# Patient Record
Sex: FEMALE | Race: BLACK OR AFRICAN AMERICAN
[De-identification: names, ages, dates, MRNs, and addresses within clinical notes are randomized per-mention and may not be internally consistent; named-entity substitution may affect disease eponyms.]

---

## 2017-02-14 ENCOUNTER — HOSPITAL ENCOUNTER (EMERGENCY)
Dept: HOSPITAL 74 - JER | Age: 27
Discharge: HOME | End: 2017-02-14
Payer: COMMERCIAL

## 2017-02-14 VITALS — BODY MASS INDEX: 31.3 KG/M2

## 2017-02-14 VITALS — SYSTOLIC BLOOD PRESSURE: 132 MMHG | TEMPERATURE: 98.2 F | DIASTOLIC BLOOD PRESSURE: 78 MMHG | HEART RATE: 72 BPM
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
## 2017-02-14 DIAGNOSIS — H11.32: ICD-10-CM

## 2017-02-14 DIAGNOSIS — S05.12XA: ICD-10-CM

## 2017-02-14 DIAGNOSIS — S09.8XXA: Primary | ICD-10-CM

## 2017-02-14 LAB
HIV 1 & 2 AB: NEGATIVE
HIV 1 AGP24: NEGATIVE
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
## 2017-08-01 ENCOUNTER — HOSPITAL ENCOUNTER (INPATIENT)
Dept: HOSPITAL 74 - JER | Age: 27
LOS: 4 days | Discharge: HOME | DRG: 544 | End: 2017-08-05
Attending: OBSTETRICS & GYNECOLOGY | Admitting: OBSTETRICS & GYNECOLOGY
Payer: SELF-PAY

## 2017-08-01 VITALS — BODY MASS INDEX: 38.4 KG/M2

## 2017-08-01 DIAGNOSIS — D69.6: ICD-10-CM

## 2017-08-01 DIAGNOSIS — A41.89: ICD-10-CM

## 2017-08-01 DIAGNOSIS — D72.828: ICD-10-CM

## 2017-08-01 DIAGNOSIS — F17.210: ICD-10-CM

## 2017-08-01 DIAGNOSIS — O03.87: Primary | ICD-10-CM

## 2017-08-01 DIAGNOSIS — F41.8: ICD-10-CM

## 2017-08-01 LAB
ALBUMIN SERPL-MCNC: 3 G/DL (ref 3.4–5)
ALP SERPL-CCNC: 73 U/L (ref 45–117)
ALT SERPL-CCNC: 41 U/L (ref 12–78)
ANION GAP SERPL CALC-SCNC: 9 MMOL/L (ref 8–16)
AST SERPL-CCNC: 35 U/L (ref 15–37)
BILIRUB SERPL-MCNC: 0.3 MG/DL (ref 0.2–1)
CALCIUM SERPL-MCNC: 8.2 MG/DL (ref 8.5–10.1)
CO2 SERPL-SCNC: 23 MMOL/L (ref 21–32)
CREAT SERPL-MCNC: 0.7 MG/DL (ref 0.55–1.02)
DEPRECATED RDW RBC AUTO: 20.2 % (ref 11.6–15.6)
GLUCOSE SERPL-MCNC: 107 MG/DL (ref 74–106)
MCH RBC QN AUTO: 26.4 PG (ref 25.7–33.7)
MCHC RBC AUTO-ENTMCNC: 31.6 G/DL (ref 32–36)
MCV RBC: 83.5 FL (ref 80–96)
NEUTROPHILS # BLD: 79 % (ref 42.8–82.8)
PLATELET # BLD AUTO: 179 K/MM3 (ref 134–434)
PLATELET # BLD EST: ADEQUATE 10*3/UL
PMV BLD: 8.5 FL (ref 7.5–11.1)
PROT SERPL-MCNC: 6.5 G/DL (ref 6.4–8.2)
WBC # BLD AUTO: 20.5 K/MM3 (ref 4–10)

## 2017-08-01 PROCEDURE — P9058 RBC, L/R, CMV-NEG, IRRAD: HCPCS

## 2017-08-01 PROCEDURE — P9038 RBC IRRADIATED: HCPCS

## 2017-08-01 PROCEDURE — 10D17ZZ EXTRACTION OF PRODUCTS OF CONCEPTION, RETAINED, VIA NATURAL OR ARTIFICIAL OPENING: ICD-10-PCS | Performed by: OBSTETRICS & GYNECOLOGY

## 2017-08-01 PROCEDURE — 30233N1 TRANSFUSION OF NONAUTOLOGOUS RED BLOOD CELLS INTO PERIPHERAL VEIN, PERCUTANEOUS APPROACH: ICD-10-PCS

## 2017-08-01 RX ADMIN — Medication SCH MLS/HR: at 14:14

## 2017-08-01 RX ADMIN — CLINDAMYCIN IN 5 PERCENT DEXTROSE SCH MLS/HR: 18 INJECTION, SOLUTION INTRAVENOUS at 21:23

## 2017-08-01 NOTE — PDOC
History of Present Illness





- General


History Source: Patient


Exam Limitations: No Limitations





- History of Present Illness


Initial Comments: 


CHIEF COMPLAINT:  28 y/o , unknown if currently pregnant BIB EMS c/o 

vaginal bleeding and strong abdominal cramping since this morning. 





HISTORY OF PRESENT ILLNESS:  The patient admits she has very irregular periods, 

and states her period hasn't been "normal" for at least 1 year.  She states 

that she did not have a menstrual cycle in  but did have 3 days of spotting 

in July.  She took a pregnancy test in late  which came back negative.  She 

states she has never carried a pregnancy to term with her last 2 being 

delivered at 6 months gestation.  She states this morning she woke up with some 

abdominal pain that feels like contractions.  At some point she stood up and 

felt a "gush" of liquid discharge.  She states she has passed multiple clots 

and is having pain about every 5 minutes.  She denies f/c, n/v/d, CP, SOB, 

hematuria, dysuria.  





Vital signs on arrival are within normal limits. 





REVIEW OF SYSTEMS:


GENERAL/CONSTITUTIONAL: No fever/chills. No weakness. No weight change.


HEAD, EYES, EARS, NOSE AND THROAT: No change in vision. No ear pain or 

discharge. No sore throat.


CARDIOVASCULAR: No chest pain or shortness of breath.


RESPIRATORY: No cough, wheezing, or hemoptysis.


GASTROINTESTINAL: +abdominal pain.  No nausea, vomiting, diarrhea.  +vaginal 

discharge. 


GENITOURINARY: No dysuria, frequency, or change in urination.


MUSCULOSKELETAL: No joint or muscle swelling or pain. No neck or back pain.


SKIN: No rash or easy bruising.


NEUROLOGIC: No headache, vertigo, loss of consciousness, or loss of sensation.





PHYSICAL EXAM:


GENERAL: The patient is awake, alert, and fully oriented, in intermittent pain. 


HEAD: Normal with no signs of trauma.


ENT: Pupils equal, round and reactive to light, extraocular movements intact, 

sclera anicteric, conjunctiva clear. Neck supple.


LUNGS: Clear to auscultation bilaterally. Normal excursion. No respiratory 

distress or use of accessory muscles.


CV: RRR, S1/S2, no MRG. Cap refill < 2 sec.


ABDOMEN: Soft, obese, TTP of lower abdominal and pelvic region.  No rebound, 

guarding or rigidity.


VAGINAL:  Copious blood in the vaginal vault with active passage of 3 very 

large clots.  Os cannot be visualized secondary to blood.  There is clear 

liquid discharge on the floor and around the patient.  


EXTREMITIES: Normal range of motion, no edema.


NEUROLOGICAL: Normal speech, normal gait. CN II-XII grossly intact.


PSYCH: Normal mood, normal affect.


SKIN: Warm, dry, normal turgor, no rashes or lesions noted.




















<Radha Main - Last Filed: 17 17:54>





<Reena Escobar - Last Filed: 17 11:41>





- General


Chief Complaint: Pain


Stated Complaint: PAIN


Time Seen by Provider: 17 10:34





Past History





- Past Medical History


Anemia: Yes


Asthma: No


Cancer: No


Cardiac Disorders: No


Diabetes: No


HTN: No


Suicide Attempt (Hx): No


Seizures: No


Thyroid Disease: No





- Reproductive History


 (#): 4


Para: 4


Therapeutic (s) & number: No


Spontaneous : 0





- Immunization History


Immunization Up to Date: Yes





- Psycho/Social/Smoking Cessation Hx


Anxiety: No


Suicidal Ideation: No


Smoking History: Current some day smoker


Have you smoked in the past 12 months: Yes


Number of Cigarettes Smoked Daily: 5


Hx Alcohol Use: No


Drug/Substance Use Hx: No


Substance Use Type: None


Hx Substance Use Treatment: No





<Radha Main - Last Filed: 17 17:54>





<Reena Escobar - Last Filed: 17 11:41>





- Past Medical History


Allergies/Adverse Reactions: 


 Allergies











Allergy/AdvReac Type Severity Reaction Status Date / Time


 


ibuprofen Allergy Intermediate Swelling Verified 17 10:35


 


peach [[place]] Allergy Intermediate Difficulty Verified 17 10:35





   Breathing  


 


peanut Allergy Intermediate Swelling Verified 17 10:35











Home Medications: 


Ambulatory Orders





NK [No Known Home Medication]  17 


Amoxicillin/Potassium Clav [Augmentin 875-125 Tablet] 1 each PO BID #0 tablet  











*Physical Exam





- Vital Signs


 Last Vital Signs











Temp Pulse Resp BP Pulse Ox


 


 97.7 F   69   20   136/85   99 


 


 17 10:00  17 10:00  17 10:00  17 10:00  17 09:00














<Reena Escobar - Last Filed: 17 11:41>





ED Treatment Course





- LABORATORY


CBC & Chemistry Diagram: 


 17 11:05





 17 11:05





<ArvinRadha sotomayor - Last Filed: 17 17:54>





- LABORATORY


CBC & Chemistry Diagram: 


 17 10:30





 17 11:05





- ADDITIONAL ORDERS


Additional order review: 


 











  17





  11:05


 


RBC  3.63


 


MCV  83.5


 


MCHC  31.6 L


 


RDW  20.2 H D


 


MPV  8.5


 


Neutrophils %  79.0  D


 


Lymphocytes %  17.0  D


 


Monocytes %  4.0














- Medications


Given in the ED: 


ED Medications














Discontinued Medications














Generic Name Dose Route Start Last Admin





  Trade Name Freq  PRN Reason Stop Dose Admin


 


Acetaminophen  1,000 mg 17 13:38 17 13:48





  Ofirmev Injection -  IVPB 17 13:39  1,000 mg





  ONCE ONE   Administration


 


Acetaminophen  1,000 mg 17 18:35 17 19:09





  Ofirmev Injection -  IVPB 17 18:36  1,000 mg





  ONCE ONE   Administration


 


Acetaminophen  650 mg 17 19:25 17 08:21





  Tylenol -  PO   650 mg





  Q3H PRN   Administration





  PAIN   


 


Citric Acid/Sodium Citrate  30 ml 17 08:56 17 09:26





  Bicitra Oral Solution -  PO 17 08:57  30 ml





  ONCE ONE   Administration


 


Fentanyl  50 mcg 17 19:27 17 19:42





  Sublimaze Injection -  IVPUSH 17 19:28  50 mcg





  [id number] PRN   Administration





  PAIN   


 


Ferrous Sulfate  325 mg 17 15:15 17 09:21





  Feosol -  PO   325 mg





  DAILY PHILLIP   Administration


 


Sodium Chloride  1,000 mls @ 1,000 mls/hr 17 11:12 17 11:27





  Normal Saline -  IV 17 12:11  1,000 mls/hr





  ASDIR STA   Administration


 


Sodium Chloride  1,000 mls @ 1,000 mls/hr 17 11:22 17 11:27





  Normal Saline -  IV 17 12:21  1,000 mls/hr





  ASDIR STA   Administration


 


Oxytocin/Lactated Ringer's  250 mls @ 1 mls/hr 17 13:30 17 15:08





  Lactated Ringer+ 15 Units Pitocin  IV   Not Given





  ASDIR PHILLIP   





  Protocol   





  0.06 UNIT/HR   


 


Sodium Chloride  1,000 mls @ 1,000 mls/hr 17 13:28 17 13:48





  Normal Saline -  IV 17 14:27  1,000 mls/hr





  ASDIR STA   Administration


 


Cefazolin Sodium 1 gm/  50 mls @ 100 mls/hr 17 13:38 17 13:59





  Dextrose  IVPB 17 14:07  100 mls/hr





  ONCE ONE   Administration


 


Vancomycin HCl 1,000 mg/  250 mls @ 250 mls/hr 17 14:21 17 16:08





  Dextrose  IVPB 17 15:20  250 mls/hr





  ONCE ONE   Administration





  Protocol   


 


Clindamycin Phosphate  50 mls @ 100 mls/hr 17 20:30 17 10:08





  Cleocin 900 Mg Premix Ivpb -  IVPB   100 mls/hr





  Q8H-IV PHILLIP   Administration


 


Dextrose/Lactated Ringer's  1,000 mls @ 125 mls/hr 17 19:30 17 21:05





  Pitocin 20 Units In D5-Lr -  IV   125 mls/hr





  ASDIR PHILLIP   Administration


 


Gentamicin Sulfate 500 mg/  262.5 mls @ 112.5 mls/hr 17 21:00 17 22:

24





  Dextrose  IVPB   112.5 mls/hr





  Q24H PHILLIP   Administration


 


Piperacillin Sod/Tazobactam  100 mls @ 200 mls/hr 17 13:00 17 08:52





  Sod 4.5 gm/ Dextrose  IVPB   200 mls/hr





  Q6H-IV PHILLIP   Administration





  Protocol   


 


Lactated Ringer's  1,000 mls @ 75 mls/hr 17 17:30 17 18:34





  Lactated Ringers Solution  IV   Not Given





  ASDIR PHILLIP   


 


Methylergonovine Maleate  0.2 mg 17 13:40 17 13:49





  Methergine Injection -  IM 17 13:41  0.2 mg





  ONCE ONE   Administration


 


Morphine Sulfate  4 mg 17 11:14 17 11:27





  Morphine Injection -  IVPUSH 17 11:15  4 mg





  ONCE ONE   Administration


 


Sodium Chloride  10 ml 17 18:08 17 18:00





  Saline Lock Flush  IVPUSH   10 ml





  PRN PRN   Administration














<Reena Escobar - Last Filed: 17 11:41>





Medical Decision Making





- Medical Decision Making


A/P:  28 y/o afebrile female most likely miscarrying.  Plan is as follows:





1. Labs


2. IV fluids


3. IV morphine





Bedside ultrasound performed shows no gestational sac or fetus.  





Patient noted to have elevated WBC count of 20, beta of 15,000 and H&H of 9.6/

30.3.


A second line was started with a 2nd bag of fluids.


The patient had normal vital signs prior to going to ultrasound and admitted 

her pain was better with morphine.





After returning from ultrasound the patient was found to be febrile, 

tachycardic and hypotensive.  Possibly septic .


PRBCs, IV tylenol, IV ancef, IV pitocin with LR and IM Methergine ordered.


Paged Barbara to ER. 


Barbara came down, spoke to the patient, found out she is a [facility] patient 

and left the ER, stating [facility] would have to see the patient. 


Paged Dr. Killian stat on her cell phone twice and her office phone twice.  

No call back after 45 minutes.





After about 40 minutes paged Dr. Jimenez to evaluate the patient since Dr. Killian has not responded. 





Dr. Jimenez returned to the ER and evaluated the patient.  He was able to get 

Dr. Killian on the phone.  Spoke with Dr. Killian and she states she hasn't 

seen the patient since last year.  She was hesitant to come see the patient but 

eventually agrees to evaluate the patient in 1 hour (spoke to her at 2:20pm).  

Will admit to [facility] for septic .


Ordered IV vanc.  Informed the patient of what's going on and the plan for 

admission. 

















<Radah Main - Last Filed: 17 17:54>





*DC/Admit/Observation/Transfer





- Discharge Dispostion


Admit: Yes





<Radha Main - Last Filed: 17 17:54>





- Attestations


Physician Attestion: 


I reviewed the case with the mid-level practitioner and agree with the mid-

level practitioner's assessment, diagnosis and disposition.








<Reena Escobar - Last Filed: 17 11:41>


Diagnosis at time of Disposition: 


  with sepsis





- Discharge Dispostion


Disposition: HOME





- Prescriptions





- Referrals

## 2017-08-01 NOTE — HP
Admitting History and Physical





- Admission


Chief Complaint: Vaginal bleeding / Abdominal pain


History of Present Illness: 


28 yo LMP  seen in ER for vaginal bleeding and abdominal pain 

associated with positive pregnancy.


Upon arrival she was hypotensive, febrile and white count was elevated. She 

received blood transfusion in the ER.





History Source: Patient


Limitations to Obtaining History: No Limitations





- Past Medical History


Gastrointestinal: Yes: Other (vomitting)


...LMP: 17


...Pregnant: Yes


Psych: Yes: Anxiety, Depression





- Past Surgical History


Past Surgical History: Yes: 





- Smoking History


Smoking history: Current some day smoker


Have you smoked in the past 12 months: Yes


Aproximately how many cigarettes per day: 5





- Alcohol/Substance Use


Hx Alcohol Use: No


History of Substance Use: reports: Marijuana (pt states quit 6 months ago)





Home Medications





- Allergies


Allergies/Adverse Reactions: 


 Allergies











Allergy/AdvReac Type Severity Reaction Status Date / Time


 


ibuprofen Allergy Intermediate Swelling Verified 17 10:35


 


peach [[place]] Allergy Intermediate Difficulty Verified 17 10:35





   Breathing  


 


peanut Allergy Intermediate Swelling Verified 17 10:35














- Home Medications


Home Medications: 


Ambulatory Orders





NK [No Known Home Medication]  17 











Review of Systems





- Review of Systems


Constitutional: reports: Chills, Fever


Eyes: reports: No Symptoms


HENT: reports: No Symptoms


Neck: reports: No Symptoms


Cardiovascular: reports: No Symptoms


Respiratory: reports: No Symptoms


Gastrointestinal: reports: No Symptoms


Genitourinary: reports: Pain, Vaginal Bleeding


Breasts: reports: No Symptoms Reported


Psychiatric: reports: Depression


Pain Intensity: 6





Physical Examination


Vital Signs: 


 Vital Signs











Temperature  99.8 F H  17 16:30


 


Pulse Rate  114 H  17 16:30


 


Respiratory Rate  22   17 16:30


 


Blood Pressure  93/39   17 16:30


 


O2 Sat by Pulse Oximetry (%)  100   17 15:30











Constitutional: Yes: Anxious, Mild Distress


Neck: Yes: Supple, Trachea Midline


Cardiovascular: Yes: Regular Rate and Rhythm


Respiratory: Yes: Regular


Gastrointestinal: Yes: Normal Bowel Sounds


Neurological: Yes: Alert, Oriented


...Motor Strength: WNL





Assessment/Plan


Septic 


Pre op for suction D&C


Consent signed


Anesthesia to see patient

## 2017-08-02 LAB
ANISOCYTOSIS BLD QL SMEAR: (no result)
APPEARANCE UR: CLEAR
BILIRUB UR STRIP.AUTO-MCNC: NEGATIVE MG/DL
COLOR UR: (no result)
DEPRECATED RDW RBC AUTO: 18.8 % (ref 11.6–15.6)
DOHLE BOD BLD QL SMEAR: (no result)
EOSINOPHIL # BLD: 1 % (ref 0–4.5)
HYPOCHROMIA BLD QL SMEAR: (no result)
KETONES UR QL STRIP: NEGATIVE
LEUKOCYTE ESTERASE UR QL STRIP.AUTO: NEGATIVE
MCH RBC QN AUTO: 26.6 PG (ref 25.7–33.7)
MCHC RBC AUTO-ENTMCNC: 32.6 G/DL (ref 32–36)
MCV RBC: 81.6 FL (ref 80–96)
METAMYELOCYTES NFR BLD: 1 % (ref 0–2)
MICROCYTES BLD QL SMEAR: (no result)
NEUTROPHILS # BLD: 72 % (ref 42.8–82.8)
NITRITE UR QL STRIP: NEGATIVE
PH UR: 6 [PH] (ref 5–8)
PLATELET # BLD AUTO: 93 K/MM3 (ref 134–434)
PLATELET # BLD EST: (no result) 10*3/UL
PMV BLD: 9.1 FL (ref 7.5–11.1)
POLYCHROMASIA BLD QL SMEAR: (no result)
PROT UR QL STRIP: NEGATIVE
PROT UR QL STRIP: NEGATIVE
RBC # BLD AUTO: 6 /HPF (ref 0–3)
RBC # UR STRIP: (no result) /UL
SP GR UR: 1.01 (ref 1–1.02)
TOXIC GRANULES BLD QL SMEAR: (no result)
UROBILINOGEN UR STRIP-MCNC: NEGATIVE MG/DL (ref 0.2–1)
WBC # BLD AUTO: 30.1 K/MM3 (ref 4–10)
WBC # UR AUTO: 24 /HPF (ref 3–5)

## 2017-08-02 RX ADMIN — ACETAMINOPHEN PRN MG: 325 TABLET ORAL at 10:12

## 2017-08-02 RX ADMIN — CLINDAMYCIN IN 5 PERCENT DEXTROSE SCH MLS/HR: 18 INJECTION, SOLUTION INTRAVENOUS at 02:55

## 2017-08-02 RX ADMIN — ACETAMINOPHEN PRN MG: 325 TABLET ORAL at 05:21

## 2017-08-02 RX ADMIN — ACETAMINOPHEN PRN MG: 325 TABLET ORAL at 21:52

## 2017-08-02 RX ADMIN — SODIUM CHLORIDE, POTASSIUM CHLORIDE, SODIUM LACTATE AND CALCIUM CHLORIDE SCH MLS/HR: 600; 310; 30; 20 INJECTION, SOLUTION INTRAVENOUS at 16:15

## 2017-08-02 RX ADMIN — Medication SCH: at 15:08

## 2017-08-02 RX ADMIN — PIPERACILLIN AND TAZOBACTAM SCH MLS/HR: 4; .5 INJECTION, POWDER, LYOPHILIZED, FOR SOLUTION INTRAVENOUS at 16:17

## 2017-08-02 RX ADMIN — CLINDAMYCIN IN 5 PERCENT DEXTROSE SCH MLS/HR: 18 INJECTION, SOLUTION INTRAVENOUS at 10:08

## 2017-08-02 RX ADMIN — PIPERACILLIN AND TAZOBACTAM SCH MLS/HR: 4; .5 INJECTION, POWDER, LYOPHILIZED, FOR SOLUTION INTRAVENOUS at 21:49

## 2017-08-02 NOTE — PN
Progress Note (SOAP)





- Subjective


Chief Complaint: 


Pt desire diet


malaise


mild abd pain





- Current Medications


Current Medications: 


Active Medications





Acetaminophen (Tylenol -)  650 mg PO Q3H PRN


   PRN Reason: PAIN


   Last Admin: 17 05:21 Dose:  650 mg


Benzocaine (Americaine Ointment -)  1 applic TP PRN PRN


   PRN Reason: PAIN


Benzocaine (Americaine 20% Spray -)  1 spray TP PRN PRN


   PRN Reason: PAIN


Bisacodyl (Dulcolax Suppository -)  10 mg RC PRN PRN


   PRN Reason: CONSTIPATION


Oxytocin/Lactated Ringer's (Lactated Ringer+ 15 Units Pitocin)  250 mls @ 1 mls/

hr IV ASDIR PHILLIP; 0.06 UNIT/HR


   PRN Reason: Protocol


   Last Admin: 17 14:14 Dose:  1 mls/hr


Clindamycin Phosphate (Cleocin 900 Mg Premix Ivpb -)  50 mls @ 100 mls/hr IVPB 

Q8H-IV PHILLIP


   Last Admin: 17 02:55 Dose:  100 mls/hr


Dextrose/Lactated Ringer's (Pitocin 20 Units In D5-Lr -)  1,000 mls @ 125 mls/

hr IV ASDIR PHILLIP


   Last Admin: 17 21:05 Dose:  125 mls/hr


Gentamicin Sulfate 500 mg/ (Dextrose)  262.5 mls @ 112.5 mls/hr IVPB Q24H PHILLIP


   Last Admin: 17 22:24 Dose:  112.5 mls/hr


Ibuprofen (Motrin -)  600 mg PO Q4H PRN


   PRN Reason: PAIN


Methylergonovine Maleate (Methergine Injection -)  0.2 mg IM Q4H PRN


   PRN Reason: EXCESSIVE BLEEDING (L&D)


Oxycodone HCl (Roxicodone -)  10 mg PO Q4H PRN


   PRN Reason: SEVERE PAIN


   Stop: 17 19:26


Senna/Docusate Sodium (Pericolace -)  2 tablet PO HS PRN


   PRN Reason: CONSTIPATION


Witch Hazel/Glycerin (Tucks Pads -)  1 pad TP PRN PRN


   PRN Reason: PAIN











- Objective


Vital Signs: 


 Vital Signs











Temperature  98.2 F   17 04:30


 


Pulse Rate  90   17 04:30


 


Respiratory Rate  20   17 04:30


 


Blood Pressure  110/62   17 04:30


 


O2 Sat by Pulse Oximetry (%)  100   17 21:00











Constitutional: Yes: Well Nourished, Anxious, Mild Distress


Gastrointestinal: Yes: Soft, Tenderness


Extremities: Yes: WNL





Problem List





- Problems


(1) Sepsis


Code(s): A41.9 - SEPSIS, UNSPECIFIED ORGANISM   





(2)  with septicemia


Code(s): O03.87 - SEPSIS FOLLOWING COMPLETE OR UNSP SPONTANEOUS 








Assessment/Plan


POD1


DC


sepsis WBC 31





Plan continue IV antibiotics


ID consult due to WBC 31 to change antibiotics

## 2017-08-02 NOTE — PN
Progress Note (short form)





- Note


Progress Note: 


ID Consult dictated


Septic 


Leukocytosis


Thrombocytopenia





Await BC


Empiric zosyn


GC / Chlamydia PCR


HIV test

## 2017-08-02 NOTE — PN
Progress Note (short form)





- Note


Progress Note: 


Pot op day#1.S/P D&C under MAC uneventful.White cell count is high but patient 

stable otherwise.No any anesthesia related problem.Patient DC from the 

anesthesia care.

## 2017-08-02 NOTE — CONS
DATE OF CONSULTATION:

 

DATE OF DICTATION:  2017

 

HISTORY OF PRESENT ILLNESS:  The patient is a 27-year-old female evaluated for septic

.

 

The patient was admitted to the hospital on 2017, with worsening abdominal

cramping and vaginal bleeding.  She was seen in the emergency room, where a sonogram

showed blood and blood clots in the endometrial cavity.  Pregnancy test was positive.

 She was noted to have a markedly elevated white blood cell count and anemia.  She

required a transfusion of packed red blood cells in the emergency room.  She was

empirically treated with clindamycin and gentamicin.  Patient was taken to the OR

where a dilatation and curettage was performed.  

 

Her hospital course has been complicated by fever, tachycardia, hypotension,

leukocytosis, and thrombocytopenia.  At the present time, she is awake and alert. 

She does complain of some mild lower abdominal cramping and continued vaginal

bleeding, although this has improved.  She denies any shaking chills, labored

breathing, cough, sputum production, vomiting, or diarrhea.  Patient denies history

of sexually transmitted diseases and reports being HIV tested on a regular basis.  

 

PAST MEDICAL HISTORY:  Essentially negative.  

 

ALLERGIES:  To IBUPROFEN.  

 

MEDICATIONS:  No home medicines.    

 

SOCIAL HISTORY:  She lives at home with family.  Positive tobacco.  Negative history

of alcohol or illicit drug use.  Patient states she tested HIV negative in the recent

past.  

 

SYSTEMS REVIEW:

Neurologic:  No loss of consciousness, seizure activity, or focal weakness.  

Cardiac:  Negative for chest pain or palpitations.

Respiratory:  Negative for cough or sputum production.  

Gastrointestinal:  Negative for vomiting or diarrhea.

Genitourinary:  As per HPI.  

 

LABORATORY DATA:  White count 30.1 with left shift, hematocrit 23.4, platelet count

93.  BUN 9, creatinine 0.7.  Urinalysis 9 white cells.  Liver enzymes normal.  

 

PHYSICAL EXAMINATION:

General:  She is awake and alert.  She is seated in bed.  She is eating lunch, in no

acute distress.  

Vital signs:  Temperature 97.9, maximum temperature 100.8, blood pressure 100/43,

pulse 94 and regular, respirations 18 per minute. 

HEENT:  Sclerae anicteric.  Oropharynx negative.  

Neck:  Supple.

Heart:  Heart sounds S1, S2.  

Lungs:  Clear.  

Abdomen:  Positive bowel sounds.  Soft.  Mild lower abdominal tenderness to

palpation.  

Extremities:  Negative for edema.  Negative Homans sign.  

 

IMPRESSION:  

1.  Septic .  

2.  Sepsis secondary to genitourinary source.  

3.  Status post dilatation and curettage.

4.  Marked leukocytosis with left shift.

5.  Thrombocytopenia secondary to sepsis.

 

Blood cultures have been obtained.  There is no endometrial culture available.  Will

obtain urine for GC and chlamydia.  Empiric antibiotic coverage with Zosyn 4.5 g IV

piggyback every 6 hours, IV fluid hydration, supportive care.  HIV testing (patient

gives verbal consent).  

 

Will follow.  Thank you for the kind referral.   

 

 

AKHIL SOARES M.D.

 

[id number]

DD: 2017 12:13

DT: 2017 13:24

Job #:  [id number]

## 2017-08-03 LAB
BASOPHILS # BLD: 0.1 % (ref 0–2)
DEPRECATED RDW RBC AUTO: 19.3 % (ref 11.6–15.6)
EOSINOPHIL # BLD: 0.7 % (ref 0–4.5)
HIV 1 & 2 AB: NEGATIVE
HIV 1 AGP24: NEGATIVE
MCH RBC QN AUTO: 26.2 PG (ref 25.7–33.7)
MCHC RBC AUTO-ENTMCNC: 31.6 G/DL (ref 32–36)
MCV RBC: 83 FL (ref 80–96)
NEUTROPHILS # BLD: 88.7 % (ref 42.8–82.8)
PLATELET # BLD AUTO: 137 K/MM3 (ref 134–434)
PMV BLD: 9.9 FL (ref 7.5–11.1)
WBC # BLD AUTO: 26.9 K/MM3 (ref 4–10)

## 2017-08-03 RX ADMIN — PIPERACILLIN AND TAZOBACTAM SCH MLS/HR: 4; .5 INJECTION, POWDER, LYOPHILIZED, FOR SOLUTION INTRAVENOUS at 15:11

## 2017-08-03 RX ADMIN — PIPERACILLIN AND TAZOBACTAM SCH MLS/HR: 4; .5 INJECTION, POWDER, LYOPHILIZED, FOR SOLUTION INTRAVENOUS at 21:10

## 2017-08-03 RX ADMIN — PIPERACILLIN AND TAZOBACTAM SCH MLS/HR: 4; .5 INJECTION, POWDER, LYOPHILIZED, FOR SOLUTION INTRAVENOUS at 09:10

## 2017-08-03 RX ADMIN — ACETAMINOPHEN PRN MG: 325 TABLET ORAL at 04:01

## 2017-08-03 RX ADMIN — ACETAMINOPHEN PRN MG: 325 TABLET ORAL at 09:10

## 2017-08-03 RX ADMIN — PIPERACILLIN AND TAZOBACTAM SCH MLS/HR: 4; .5 INJECTION, POWDER, LYOPHILIZED, FOR SOLUTION INTRAVENOUS at 03:59

## 2017-08-03 RX ADMIN — SODIUM CHLORIDE, POTASSIUM CHLORIDE, SODIUM LACTATE AND CALCIUM CHLORIDE SCH: 600; 310; 30; 20 INJECTION, SOLUTION INTRAVENOUS at 18:34

## 2017-08-03 NOTE — OP
DATE OF OPERATION:  2017

 

PREOPERATIVE DIAGNOSIS:  Septic .

 

POSTOPERATIVE DIAGNOSIS:  Septic .

 

PROCEDURE:  Suction dilatation and curettage.

 

SURGEON:  Lluvia Killian MD

 

ANESTHESIA:  General.

 

COMPLICATIONS:  Sepsis.

 

DESCRIPTION OF PROCEDURE:  Patient was taken to the operating room where general

anesthesia was administered.  Patient was then placed in lithotomy position.  She was

then prepped and draped in proper sterile fashion.  A weighted speculum was placed in

the vagina.  The anterior lip of the cervix was grasped with a single-tooth

tenaculum.  A 10-mm suction curette was then gently introduced into the uterine

cavity.  The suction curette was rotated to clear the uterus of all products of

conception.  The cervical os was found to be open with significant amount of products

of conception retrieved.  There was a lot of bleeding noted.  The curette was

reintroduced to clear the uterus of all products of conception.  Hemostasis was

obtained with the aid of additional dosage of Pitocin.  Then, when finished, the

instruments were removed.  The patient was taken out of lithotomy position.  She was

taken to PACU in stable condition.

 

PATHOLOGY:  Products of conception.

 

 

CHESTER [id number]

DD: 2017 01:11

DT: 2017 10:00

Job #:  [id number]

## 2017-08-03 NOTE — PN
Progress Note, Physician


Chief Complaint: 


Status post suction D&C





History of Present Illness: 


28 yo Para 4, status post suction D&C for septic , seen and evaluated.


She c/o mild abdominal cramps.She's afebrile. She's on IV antibiotics.








- Current Medication List


Current Medications: 


Active Medications





Acetaminophen (Tylenol -)  650 mg PO Q3H PRN


   PRN Reason: PAIN


   Last Admin: 17 04:01 Dose:  650 mg


Benzocaine (Americaine Ointment -)  1 applic TP PRN PRN


   PRN Reason: PAIN


Benzocaine (Americaine 20% Spray -)  1 spray TP PRN PRN


   PRN Reason: PAIN


Bisacodyl (Dulcolax Suppository -)  10 mg RC PRN PRN


   PRN Reason: CONSTIPATION


Piperacillin Sod/Tazobactam (Sod 4.5 gm/ Dextrose)  100 mls @ 200 mls/hr IVPB 

Q6H-IV PHILLIP


   PRN Reason: Protocol


   Last Admin: 17 03:59 Dose:  200 mls/hr


Lactated Ringer's (Lactated Ringers Solution)  1,000 mls @ 75 mls/hr IV ASDIR 

PHILLIP


   Last Admin: 17 16:15 Dose:  75 mls/hr


Methylergonovine Maleate (Methergine Injection -)  0.2 mg IM Q4H PRN


   PRN Reason: EXCESSIVE BLEEDING (L&D)


Senna/Docusate Sodium (Pericolace -)  2 tablet PO HS PRN


   PRN Reason: CONSTIPATION


Witch Hazel/Glycerin (Tucks Pads -)  1 pad TP PRN PRN


   PRN Reason: PAIN











- Objective


Vital Signs: 


 Vital Signs











Temperature  98.7 F   17 06:00


 


Pulse Rate  86   17 06:00


 


Respiratory Rate  18   17 06:00


 


Blood Pressure  109/68   17 06:00


 


O2 Sat by Pulse Oximetry (%)  100   17 21:00











Constitutional: Yes: Calm


Eyes: Yes: Conjunctiva Clear


HENT: Yes: Atraumatic


Neck: Yes: Supple, Trachea Midline


Cardiovascular: Yes: Regular Rate and Rhythm


Respiratory: Yes: Regular, CTA Bilaterally


Gastrointestinal: Yes: Normal Bowel Sounds


Genitourinary: Yes: Vaginal Bleeding


Extremities: Yes: WNL


Neurological: Yes: Alert, Oriented


...Motor Strength: WNL


Psychiatric: Yes: Alert, Oriented


Labs: 


 CBC, BMP





 17 07:00 











Assessment/Plan


Septic 


Status post suction D&C


Continue IV antibiotics


F/U blood culture


Continue management as per ID

## 2017-08-03 NOTE — PATH
Surgical Pathology Report



Patient Name:  TRAV BETH

Accession #:  [id number]

Med. Rec. #:  [id number]                                                        

   /Age/Gender:  1990 (Age: 27) / F

Account:  [id number]                                                          

             Location: [facility] OBS/GYN

Taken:  2017

Received:  2017

Reported:  8/3/2017

Physicians:  Lluvia Killian M.D.

  



Specimen(s) Received

 PRODUCTS OF CONCEPTION 





Clinical History

Septic 







Final Diagnosis

PRODUCTS OF CONCEPTION:

NO SOMATIC FETAL TISSUE IDENTIFIED.

IMMATURE PLACENTAL VILLOUS TISSUE WITH FOCI OF ACUTE INFLAMMATION (ACUTE

VILLITIS) PRESENT.



Comment: Clinical correlations and correlations with microbiology studies are

suggested.





***Electronically Signed***

Alexander Finkelstein, M.D.





Gross Description

Received in formalin labeled "products of conception" is a 20.0 x 14.0 x 2.2 cm

aggregate of tan-brown soft tissue fragments. Villous tissue is identified. No

definite fetal somatic tissue is identified. A representative portion is

submitted in one cassette.





[facility]

## 2017-08-03 NOTE — PN
Progress Note, Physician


History of Present Illness: 


Awake, alert


No c/o abdominal pain or vaginal bleeding


No fever/ chills


Cultures no growth





- Current Medication List


Current Medications: 


Active Medications





Acetaminophen (Tylenol -)  650 mg PO Q3H PRN


   PRN Reason: PAIN


   Last Admin: 17 09:10 Dose:  650 mg


Benzocaine (Americaine Ointment -)  1 applic TP PRN PRN


   PRN Reason: PAIN


Benzocaine (Americaine 20% Spray -)  1 spray TP PRN PRN


   PRN Reason: PAIN


Bisacodyl (Dulcolax Suppository -)  10 mg RC PRN PRN


   PRN Reason: CONSTIPATION


Piperacillin Sod/Tazobactam (Sod 4.5 gm/ Dextrose)  100 mls @ 200 mls/hr IVPB 

Q6H-IV PHILLIP


   PRN Reason: Protocol


   Last Admin: 17 09:10 Dose:  200 mls/hr


Lactated Ringer's (Lactated Ringers Solution)  1,000 mls @ 75 mls/hr IV ASDIR 

PHILLIP


   Last Admin: 17 16:15 Dose:  75 mls/hr


Methylergonovine Maleate (Methergine Injection -)  0.2 mg IM Q4H PRN


   PRN Reason: EXCESSIVE BLEEDING (L&D)


Senna/Docusate Sodium (Pericolace -)  2 tablet PO HS PRN


   PRN Reason: CONSTIPATION


Witch Hazel/Glycerin (Tucks Pads -)  1 pad TP PRN PRN


   PRN Reason: PAIN











- Objective


Vital Signs: 


 Vital Signs











Temperature  98.2 F   17 14:00


 


Pulse Rate  86   17 14:00


 


Respiratory Rate  18   17 14:00


 


Blood Pressure  117/61   17 14:00


 


O2 Sat by Pulse Oximetry (%)  100   17 21:00











Constitutional: Yes: No Distress


Eyes: Yes: Conjunctiva Clear


Cardiovascular: Yes: Regular Rate and Rhythm, Murmur, S1, S2


Respiratory: Yes: CTA Bilaterally


Gastrointestinal: Yes: Normal Bowel Sounds, Soft.  No: Tenderness


Edema: No


Labs: 


 CBC, BMP





 17 07:00 











Assessment/Plan


Septic 


Leukocytosis





Await cultures


Check CBC


Continue empiric zosyn

## 2017-08-04 LAB
ANISOCYTOSIS BLD QL SMEAR: (no result)
BASOPHILS # BLD: 0.2 % (ref 0–2)
DEPRECATED RDW RBC AUTO: 19.4 % (ref 11.6–15.6)
EOSINOPHIL # BLD: 1.2 % (ref 0–4.5)
HYPOCHROMIA BLD QL SMEAR: (no result)
MACROCYTES BLD QL SMEAR: (no result)
MCH RBC QN AUTO: 26.7 PG (ref 25.7–33.7)
MCHC RBC AUTO-ENTMCNC: 32.1 G/DL (ref 32–36)
MCV RBC: 83.1 FL (ref 80–96)
NEUTROPHILS # BLD: 78.5 % (ref 42.8–82.8)
PLATELET # BLD AUTO: 143 K/MM3 (ref 134–434)
PMV BLD: 9.3 FL (ref 7.5–11.1)
WBC # BLD AUTO: 17.4 K/MM3 (ref 4–10)

## 2017-08-04 RX ADMIN — PIPERACILLIN AND TAZOBACTAM SCH MLS/HR: 4; .5 INJECTION, POWDER, LYOPHILIZED, FOR SOLUTION INTRAVENOUS at 08:32

## 2017-08-04 RX ADMIN — PIPERACILLIN AND TAZOBACTAM SCH MLS/HR: 4; .5 INJECTION, POWDER, LYOPHILIZED, FOR SOLUTION INTRAVENOUS at 14:15

## 2017-08-04 RX ADMIN — FERROUS SULFATE TAB EC 324 MG (65 MG FE EQUIVALENT) SCH MG: 324 (65 FE) TABLET DELAYED RESPONSE at 15:21

## 2017-08-04 RX ADMIN — PIPERACILLIN AND TAZOBACTAM SCH MLS/HR: 4; .5 INJECTION, POWDER, LYOPHILIZED, FOR SOLUTION INTRAVENOUS at 04:12

## 2017-08-04 RX ADMIN — ACETAMINOPHEN PRN MG: 325 TABLET ORAL at 11:06

## 2017-08-04 RX ADMIN — ACETAMINOPHEN PRN MG: 325 TABLET ORAL at 23:55

## 2017-08-04 RX ADMIN — PIPERACILLIN AND TAZOBACTAM SCH MLS/HR: 4; .5 INJECTION, POWDER, LYOPHILIZED, FOR SOLUTION INTRAVENOUS at 20:41

## 2017-08-04 NOTE — PN
Progress Note, Physician


History of Present Illness: 


Feeling better


No abdominal pain


Minimal vaginal bleeding


No fever/ chills


WBC improving


Cultures negative





- Current Medication List


Current Medications: 


Active Medications





Acetaminophen (Tylenol -)  650 mg PO Q3H PRN


   PRN Reason: PAIN


   Last Admin: 17 11:06 Dose:  650 mg


Benzocaine (Americaine Ointment -)  1 applic TP PRN PRN


   PRN Reason: PAIN


Benzocaine (Americaine 20% Spray -)  1 spray TP PRN PRN


   PRN Reason: PAIN


Bisacodyl (Dulcolax Suppository -)  10 mg RC PRN PRN


   PRN Reason: CONSTIPATION


Piperacillin Sod/Tazobactam (Sod 4.5 gm/ Dextrose)  100 mls @ 200 mls/hr IVPB 

Q6H-IV PHILLIP


   PRN Reason: Protocol


   Last Admin: 17 14:15 Dose:  200 mls/hr


Lactated Ringer's (Lactated Ringers Solution)  1,000 mls @ 75 mls/hr IV ASDIR 

PHILLIP


   Last Admin: 17 18:34 Dose:  Not Given


Methylergonovine Maleate (Methergine Injection -)  0.2 mg IM Q4H PRN


   PRN Reason: EXCESSIVE BLEEDING (L&D)


Senna/Docusate Sodium (Pericolace -)  2 tablet PO HS PRN


   PRN Reason: CONSTIPATION


Sodium Chloride (Saline Lock Flush)  10 ml IVPUSH PRN PRN


   Last Admin: 17 18:00 Dose:  10 ml


Witch Hazel/Glycerin (Tucks Pads -)  1 pad TP PRN PRN


   PRN Reason: PAIN











- Objective


Vital Signs: 


 Vital Signs











Temperature  100 F H  17 14:00


 


Pulse Rate  73   17 14:00


 


Respiratory Rate  20   17 14:00


 


Blood Pressure  137/81   17 14:00


 


O2 Sat by Pulse Oximetry (%)  100   17 21:00











Constitutional: Yes: No Distress


Cardiovascular: Yes: Regular Rate and Rhythm, S1, S2


Respiratory: Yes: CTA Bilaterally


Gastrointestinal: Yes: Soft.  No: Tenderness


Edema: No


Labs: 


 CBC, BMP





 17 07:00 











Assessment/Plan


Septic 


 Leukocytosis- improved





 


Check CBC am


Continue empiric zosyn


If stable, CBC improved, switch to po antibiotics am


Fe supplement

## 2017-08-05 VITALS — HEART RATE: 69 BPM | SYSTOLIC BLOOD PRESSURE: 136 MMHG | DIASTOLIC BLOOD PRESSURE: 85 MMHG | TEMPERATURE: 97.7 F

## 2017-08-05 LAB
ANISOCYTOSIS BLD QL SMEAR: (no result)
DEPRECATED RDW RBC AUTO: 19.2 % (ref 11.6–15.6)
EOSINOPHIL # BLD: 3 % (ref 0–4.5)
HYPOCHROMIA BLD QL SMEAR: (no result)
MCH RBC QN AUTO: 26.6 PG (ref 25.7–33.7)
MCHC RBC AUTO-ENTMCNC: 32 G/DL (ref 32–36)
MCV RBC: 83.2 FL (ref 80–96)
NEUTROPHILS # BLD: 70 % (ref 42.8–82.8)
PLATELET # BLD AUTO: 176 K/MM3 (ref 134–434)
PLATELET # BLD EST: ADEQUATE 10*3/UL
PLATELET COMMENT2: (no result)
PMV BLD: 8.7 FL (ref 7.5–11.1)
WBC # BLD AUTO: 12.1 K/MM3 (ref 4–10)

## 2017-08-05 RX ADMIN — FERROUS SULFATE TAB EC 324 MG (65 MG FE EQUIVALENT) SCH MG: 324 (65 FE) TABLET DELAYED RESPONSE at 09:21

## 2017-08-05 RX ADMIN — PIPERACILLIN AND TAZOBACTAM SCH MLS/HR: 4; .5 INJECTION, POWDER, LYOPHILIZED, FOR SOLUTION INTRAVENOUS at 08:52

## 2017-08-05 RX ADMIN — PIPERACILLIN AND TAZOBACTAM SCH MLS/HR: 4; .5 INJECTION, POWDER, LYOPHILIZED, FOR SOLUTION INTRAVENOUS at 02:33

## 2017-08-05 RX ADMIN — ACETAMINOPHEN PRN MG: 325 TABLET ORAL at 08:21

## 2017-08-05 NOTE — PN
Progress Note (SOAP)





- Subjective


Chief Complaint: 


Pt with slight headache - improved after tyleno


pt with gas pain





- Current Medications


Current Medications: 


Active Medications





Acetaminophen (Tylenol -)  650 mg PO Q3H PRN


   PRN Reason: PAIN


   Last Admin: 17 08:21 Dose:  650 mg


Benzocaine (Americaine Ointment -)  1 applic TP PRN PRN


   PRN Reason: PAIN


Benzocaine (Americaine 20% Spray -)  1 spray TP PRN PRN


   PRN Reason: PAIN


Bisacodyl (Dulcolax Suppository -)  10 mg RC PRN PRN


   PRN Reason: CONSTIPATION


Ferrous Sulfate (Feosol -)  325 mg PO DAILY PHILLIP


   Last Admin: 17 15:21 Dose:  325 mg


Piperacillin Sod/Tazobactam (Sod 4.5 gm/ Dextrose)  100 mls @ 200 mls/hr IVPB 

Q6H-IV PHILLIP


   PRN Reason: Protocol


   Last Admin: 17 08:52 Dose:  200 mls/hr


Lactated Ringer's (Lactated Ringers Solution)  1,000 mls @ 75 mls/hr IV ASDIR 

PHILLIP


   Last Admin: 17 18:34 Dose:  Not Given


Methylergonovine Maleate (Methergine Injection -)  0.2 mg IM Q4H PRN


   PRN Reason: EXCESSIVE BLEEDING (L&D)


Senna/Docusate Sodium (Pericolace -)  2 tablet PO HS PRN


   PRN Reason: CONSTIPATION


Sodium Chloride (Saline Lock Flush)  10 ml IVPUSH PRN PRN


   Last Admin: 17 18:00 Dose:  10 ml


Witch Hazel/Glycerin (Tucks Pads -)  1 pad TP PRN PRN


   PRN Reason: PAIN











- Objective


Vital Signs: 


 Vital Signs











Temperature  98.9 F   17 02:38


 


Pulse Rate  66   17 02:38


 


Respiratory Rate  18   17 02:38


 


Blood Pressure  129/74   17 02:38


 


O2 Sat by Pulse Oximetry (%)  100   17 21:00











Constitutional: Yes: Well Nourished, No Distress, Calm


Gastrointestinal: Yes: WNL, Soft


Musculoskeletal: Yes: WNL


Extremities: Yes: WNL





Labs


Lab Results: 


 CBC, BMP





 17 07:00 











Problem List





- Problems


(1) Sepsis


Code(s): A41.9 - SEPSIS, UNSPECIFIED ORGANISM   





(2)  with septicemia


Code(s): O03.87 - SEPSIS FOLLOWING COMPLETE OR UNSP SPONTANEOUS 








Assessment/Plan


POD 3


DC


sepsis - improving





Plan continue IV antibiotics until WBC


may change to po and DC home


ID consult appreciated

## 2017-08-05 NOTE — PN
Progress Note, Physician


History of Present Illness: 


No complaints


No abdominal pain or vaginal bleeding


No F/C      WBC down





- Current Medication List


Current Medications: 


Active Medications





Acetaminophen (Tylenol -)  650 mg PO Q3H PRN


   PRN Reason: PAIN


   Last Admin: 17 08:21 Dose:  650 mg


Benzocaine (Americaine Ointment -)  1 applic TP PRN PRN


   PRN Reason: PAIN


Benzocaine (Americaine 20% Spray -)  1 spray TP PRN PRN


   PRN Reason: PAIN


Bisacodyl (Dulcolax Suppository -)  10 mg RC PRN PRN


   PRN Reason: CONSTIPATION


Ferrous Sulfate (Feosol -)  325 mg PO DAILY PHILLIP


   Last Admin: 17 09:21 Dose:  325 mg


Piperacillin Sod/Tazobactam (Sod 4.5 gm/ Dextrose)  100 mls @ 200 mls/hr IVPB 

Q6H-IV PHILLIP


   PRN Reason: Protocol


   Last Admin: 17 08:52 Dose:  200 mls/hr


Lactated Ringer's (Lactated Ringers Solution)  1,000 mls @ 75 mls/hr IV ASDIR 

PHILLIP


   Last Admin: 17 18:34 Dose:  Not Given


Methylergonovine Maleate (Methergine Injection -)  0.2 mg IM Q4H PRN


   PRN Reason: EXCESSIVE BLEEDING (L&D)


Senna/Docusate Sodium (Pericolace -)  2 tablet PO HS PRN


   PRN Reason: CONSTIPATION


Sodium Chloride (Saline Lock Flush)  10 ml IVPUSH PRN PRN


   Last Admin: 17 18:00 Dose:  10 ml


Witch Hazel/Glycerin (Tucks Pads -)  1 pad TP PRN PRN


   PRN Reason: PAIN











- Objective


Vital Signs: 


 Vital Signs











Temperature  98.9 F   17 02:38


 


Pulse Rate  66   17 02:38


 


Respiratory Rate  18   17 02:38


 


Blood Pressure  129/74   17 02:38


 


O2 Sat by Pulse Oximetry (%)  100   17 21:00











Constitutional: Yes: No Distress


Eyes: Yes: Conjunctiva Clear


Cardiovascular: Yes: Regular Rate and Rhythm, S1, S2


Respiratory: Yes: CTA Bilaterally


Gastrointestinal: Yes: Normal Bowel Sounds, Soft.  No: Tenderness


Labs: 


 CBC, BMP





 17 10:30 











Assessment/Plan


Septic 


 Leukocytosis- improved   Cultures negative





 


 


 


 switch to po Augmentin 875mg bid x7d


 Outpatient Gyn follow up

## 2019-07-23 ENCOUNTER — HOSPITAL ENCOUNTER (EMERGENCY)
Dept: HOSPITAL 74 - JER | Age: 29
LOS: 1 days | Discharge: LEFT BEFORE DISCHARGE | End: 2019-07-24
Payer: COMMERCIAL

## 2019-07-23 VITALS — BODY MASS INDEX: 32.6 KG/M2
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
## 2019-07-23 DIAGNOSIS — S80.212A: ICD-10-CM

## 2019-07-23 DIAGNOSIS — S09.93XA: Primary | ICD-10-CM
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
## 2019-07-24 VITALS — SYSTOLIC BLOOD PRESSURE: 145 MMHG | TEMPERATURE: 98.5 F | DIASTOLIC BLOOD PRESSURE: 82 MMHG | HEART RATE: 100 BPM
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
## 2019-12-18 ENCOUNTER — HOSPITAL ENCOUNTER (EMERGENCY)
Dept: HOSPITAL 74 - JER | Age: 29
Discharge: HOME | End: 2019-12-18
Payer: COMMERCIAL

## 2019-12-18 VITALS — TEMPERATURE: 98 F | SYSTOLIC BLOOD PRESSURE: 135 MMHG | DIASTOLIC BLOOD PRESSURE: 70 MMHG | HEART RATE: 91 BPM

## 2019-12-18 VITALS — BODY MASS INDEX: 33.4 KG/M2
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
## 2019-12-18 DIAGNOSIS — F10.120: Primary | ICD-10-CM

## 2019-12-18 DIAGNOSIS — Z88.6: ICD-10-CM

## 2019-12-18 DIAGNOSIS — F14.10: ICD-10-CM

## 2019-12-18 DIAGNOSIS — T69.022A: ICD-10-CM

## 2019-12-18 DIAGNOSIS — T69.021A: ICD-10-CM

## 2019-12-18 DIAGNOSIS — Z91.018: ICD-10-CM

## 2019-12-18 LAB
ALBUMIN SERPL-MCNC: 3.7 G/DL (ref 3.4–5)
ALP SERPL-CCNC: 103 U/L (ref 45–117)
ALT SERPL-CCNC: 62 U/L (ref 13–61)
ANION GAP SERPL CALC-SCNC: 9 MMOL/L (ref 8–16)
AST SERPL-CCNC: 55 U/L (ref 15–37)
BASOPHILS # BLD: 0.8 % (ref 0–2)
BILIRUB SERPL-MCNC: 0.2 MG/DL (ref 0.2–1)
BUN SERPL-MCNC: 8 MG/DL (ref 7–18)
CALCIUM SERPL-MCNC: 8.8 MG/DL (ref 8.5–10.1)
CHLORIDE SERPL-SCNC: 107 MMOL/L (ref 98–107)
CO2 SERPL-SCNC: 24 MMOL/L (ref 21–32)
CREAT SERPL-MCNC: 0.8 MG/DL (ref 0.55–1.3)
DEPRECATED RDW RBC AUTO: 17.9 % (ref 11.6–15.6)
EOSINOPHIL # BLD: 1.3 % (ref 0–4.5)
GLUCOSE SERPL-MCNC: 69 MG/DL (ref 74–106)
HCT VFR BLD CALC: 37.5 % (ref 32.4–45.2)
HGB BLD-MCNC: 12 GM/DL (ref 10.7–15.3)
LYMPHOCYTES # BLD: 51.2 % (ref 8–40)
MCH RBC QN AUTO: 27 PG (ref 25.7–33.7)
MCHC RBC AUTO-ENTMCNC: 31.9 G/DL (ref 32–36)
MCV RBC: 84.7 FL (ref 80–96)
MONOCYTES # BLD AUTO: 6.5 % (ref 3.8–10.2)
NEUTROPHILS # BLD: 40.2 % (ref 42.8–82.8)
PLATELET # BLD AUTO: 405 K/MM3 (ref 134–434)
PMV BLD: 9.1 FL (ref 7.5–11.1)
POTASSIUM SERPLBLD-SCNC: 4.5 MMOL/L (ref 3.5–5.1)
PROT SERPL-MCNC: 7.7 G/DL (ref 6.4–8.2)
RBC # BLD AUTO: 4.43 M/MM3 (ref 3.6–5.2)
SODIUM SERPL-SCNC: 141 MMOL/L (ref 136–145)
WBC # BLD AUTO: 6.6 K/MM3 (ref 4–10)

## 2019-12-18 PROCEDURE — 3E033GC INTRODUCTION OF OTHER THERAPEUTIC SUBSTANCE INTO PERIPHERAL VEIN, PERCUTANEOUS APPROACH: ICD-10-PCS

## 2019-12-18 NOTE — PDOC
Documentation entered by Berna Dooley SCRIBE, acting as scribe for Saleem Valdes MD.








Saleem Valdes MD:  This documentation has been prepared by the Miah bryson Brenda, SCRIBE, under my direction and personally reviewed by me in its entirety.  I 

confirm that the documentation accurately reflects all work, treatment, 

procedures, and medical decision making performed by me.  





Attending Attestation





- Resident


Resident Name: Urbano Chadwick





- ED Attending Attestation


I have performed the following: I have examined & evaluated the patient, The 

case was reviewed & discussed with the resident, I agree w/resident's findings 

& plan, Exceptions are as noted





- HPI


HPI: 





12/18/19 16:16


29 F with h/o HTN, cocaine and ETOH abuse, presents to ED intoxicated. Per EMS, 

pt admitted to using cocaine and ETOH. Pt in ED is unable to contribute any 

additional history at this time due to intoxication.





- Physicial Exam


PE: 





12/18/19 14:54


GENERAL: somnolent but arousable, in no acute distress.


HEAD: No signs of trauma


EYES: PERRLA, EOMI, sclera anicteric, conjunctiva clear


ENT: Auricles normal inspection, hearing grossly normal, nares patent, 

oropharynx clear without exudates. Moist mucosa


NECK: Nontender, no stepoffs, Normal ROM, supple, no lymphadenopathy, JVD, or 

masses


LUNGS: Breath sounds equal, clear to auscultation bilaterally.  No wheezes, and 

no crackles


HEART: Regular rate and rhythm, normal S1 and S2, no murmurs, rubs or gallops


ABDOMEN: Soft, nontender, normoactive bowel sounds.  No guarding, no rebound.  

No masses


EXTREMITIES: Normal range of motion, no edema.  No clubbing or cyanosis. No 

cords, erythema, or tenderness


NEUROLOGICAL: Cranial nerves II through XII intact. 5/5 strength and sensation 

in all extremities


SKIN: Warm, Dry, normal turgor, no rashes or lesions noted.





- Medical Decision Making





12/18/19 16:20


29 F presenting to ED intoxicated after reported etoh and cocaine use. Pt 

without any external signs of trauma. However, because she is unable to provide 

ROS, will obtain head CT to r/o ICH.


- Labs


- CT head


- Re-eval when clinically sober





Pt signed out to Dr. Flower at 4:30PM, pending labs and re-evaluation when 

clinically sober

## 2019-12-18 NOTE — PDOC
*Physical Exam





- Vital Signs


 Last Vital Signs











Temp Pulse Resp BP Pulse Ox


 


    69   17   139/84   100 


 


    12/18/19 12:46  12/18/19 12:46  12/18/19 12:46  12/18/19 12:46














- Physical Exam





12/18/19 18:43


Gen: aaox3, tearful, remorseful


heart: +s1s2 reg


lungs: cta b/l


abd: soft, nt/nd +bs


ext: trench foot b/l LE, pulses intact, ttp over bottoms of her feet, no c/c/e, 

no calf ttp, moves all extremities





ED Treatment Course





- LABORATORY


CBC & Chemistry Diagram: 


 12/18/19 18:19





 12/18/19 18:19





Medical Decision Making





- Medical Decision Making





12/18/19 18:44


a/p: 28yo female signed out from the prior attending after the patient admits 

to drinking etoh and smoking cocaine 


-pt states she finished inpt rehab on friday and relapsed and has been drinking 

since saturday


-admits to cocaine use today


-pt denies SI/HI


-pt denies cp/sob/abd pain


-pt with trench foot b/l - states out in the rain x 2 days and in wet socks, no 

breaks in the skin, no infection


-pt pending labs and imaging


-pt is undomiciled


12/18/19 18:47


case discussed with [facility] who does have female beds tonight


labs pending


head ct neg


12/18/19 19:19


pt eating


stable for dc to [facility]





Discharge





- Discharge Information


Problems reviewed: Yes


Clinical Impression/Diagnosis: 


 Trench feet, Alcohol use disorder, Cocaine use








- Follow up/Referral


Referrals: 


Felipa Christian MD [Primary Care Provider] - 





- Patient Discharge Instructions





- Post Discharge Activity

## 2019-12-18 NOTE — PDOC
History of Present Illness





- General


Chief Complaint: Substance Abuse


Stated Complaint: DRUG USE


Time Seen by Provider: 19 13:50


History Source: Patient, EMS





- History of Present Illness


Initial Comments: 





19 16:21


Ms. Melendez is a 28 y/o woman with no relevant PMH p/w intoxication. History 

limited by somnolence/intoxication. She reports drinking alcohol today as well 

as ingesting cocaine. She reports relapsing on alcohol and cocaine approx 5 

months ago, and reports depression after losing custody of her daughter. She 

denies any SI or HI. She denies any other ingestions of drugs or medication. 








19 18:23


On reassessment, she is able to communicate further history. She reports being 

undomiciled for the last several months. She reports sleeping in a shelter, but 

that for the last two days she has been outdoors. She reports that her feet 

have been hurting as her socks have been soaked and cold, and have started to 

change in color slightly (becoming white in color). She reports binge drinking 

today, as well as endorsing some cocaine use. She reports interest in detox. 

She denies any SI or HI. 











Past History





- Past Medical History


Allergies/Adverse Reactions: 


 Allergies











Allergy/AdvReac Type Severity Reaction Status Date / Time


 


ibuprofen Allergy Intermediate Swelling Verified 19 12:46


 


mushroom Allergy Intermediate Swelling Verified 19 12:46


 


peach [[place]] Allergy Intermediate Difficulty Verified 19 12:46





   Breathing  


 


peanut Allergy Intermediate Swelling Verified 19 12:46











Home Medications: 


Ambulatory Orders





Oxycodone HCl/Acetaminophen [Percocet 5-325 mg Tablet] 1 tab PO Q6H PRN #12 

tablet MDD 4 tabs 18 


Ferrous Sulfate [Iron] 325 mg PO DAILY 19 








Anemia: Yes


Asthma: No


Cancer: No


Cardiac Disorders: No


COPD: No


Diabetes: No


HTN: No


Seizures: No


Thyroid Disease: No





- Reproductive History


 (#): 4


Para: 4


Therapeutic (s) & number: No


Spontaneous : 0





- Immunization History


Immunization Up to Date: Yes





- Psycho Social/Smoking Cessation Hx


Smoking History: Current every day smoker


Have you smoked in the past 12 months: No


Number of Cigarettes Smoked Daily: 20


Information on smoking cessation initiated: No


'Breaking Loose' booklet given: 17


Hx Alcohol Use: Yes


Drug/Substance Use Hx: Yes (COCAINE)


Substance Use Type: None


Hx Substance Use Treatment: No





**Review of Systems





- Review of Systems


Able to Perform ROS?: No (Intoxication)





*Physical Exam





- Vital Signs


 Last Vital Signs











Temp Pulse Resp BP Pulse Ox


 


    69   17   139/84   100 


 


    19 12:46  19 12:46  19 12:46  19 12:46














- Physical Exam





19 19:38


PE: 


GENERAL: Somnolent but arousable. 


HEAD: No signs of trauma, normocephalic, atraumatic 


EYES: PERRLA, EOMI, sclera anicteric, conjunctiva clear


ENT: Auricles normal inspection, hearing grossly normal, nares patent, 

oropharynx clear without exudates. Moist mucosa


NECK: Normal ROM, supple, no lymphadenopathy, JVD, or masses


LUNGS: No distress, clear to auscultation bilaterally 


HEART: Regular rate and rhythm, normal S1 and S2, no murmurs, rubs or gallops, 

peripheral pulses normal and equal bilaterally. 


ABDOMEN: Soft, nontender, normoactive bowel sounds.  No guarding, no rebound.  

No masses


EXTREMITIES : 2+ DP pulses noted bilaterally. Bilateral trench foot noted, 

alongside foul odor. Normal range of motion, no edema.  No clubbing or cyanosis


NEUROLOGICAL: No focal sensorimotor deficits 


SKIN: Except as noted above: Warm, normal turgor, no rashes or lesions noted








ED Treatment Course





- LABORATORY


CBC & Chemistry Diagram: 


 19 18:19





 19 18:19





Medical Decision Making





- Medical Decision Making





29F w/hx polysubstance use p/w intoxication (EtOH, cocaine), and bilateral 

trench foot on exam with strong equal pulses. 





Plan: 


CBC


CMP


Serum pregnancy


POC Glucose


CT Head





Dispo: 


Pending labs, imaging


Discharge to [facility] for detox if stable for discharge





---


Glu - 69 on CMP


CT head negative for acute process





---


19 19:29


Repeat POC glucose -101





Plan for discharge to [facility] for detox. 











Discharge





- Discharge Information


Problems reviewed: Yes


Clinical Impression/Diagnosis: 


 Alcohol use disorder, Cocaine use





Trench feet


Qualifiers:


 Encounter type: initial encounter Laterality: unspecified laterality Qualified 

Code(s): T69.029A - Immersion foot, unspecified foot, initial encounter





Condition: Stable


Disposition: HOME





- Admission


No





- Follow up/Referral


Referrals: 


Felipa Christian MD [Primary Care Provider] - 





- Patient Discharge Instructions


Patient Printed Discharge Instructions:  DI for Foot Pain


Additional Instructions: 


You were seen in the ER for intoxication, and foot pain. Your feet have trench 

foot - a condition where wet and cold exposure begins to hurt the feet. Please 

try to keep your feet as dry as possible. Over the counter medication like 

tylenol can help control the pain while they heal. Please return to the ER if 

your feet change color, you lose feeling in the feet, if the pain worsens, or 

if you cannot walk. Please follow up with detox as soon as possible. 





- Post Discharge Activity

## 2020-09-09 ENCOUNTER — HOSPITAL ENCOUNTER (EMERGENCY)
Dept: HOSPITAL 74 - JERFT | Age: 30
Discharge: HOME | End: 2020-09-09
Payer: COMMERCIAL

## 2020-09-09 VITALS — BODY MASS INDEX: 23.6 KG/M2

## 2020-09-09 VITALS — DIASTOLIC BLOOD PRESSURE: 90 MMHG | SYSTOLIC BLOOD PRESSURE: 146 MMHG | HEART RATE: 89 BPM | TEMPERATURE: 98.9 F

## 2020-09-09 DIAGNOSIS — R19.7: Primary | ICD-10-CM

## 2020-09-09 LAB
APPEARANCE UR: CLEAR
BACTERIA # UR AUTO: 423 /UL (ref 0–1359)
BILIRUB UR STRIP.AUTO-MCNC: NEGATIVE MG/DL
CASTS URNS QL MICRO: 0 /UL (ref 0–3.1)
COLOR UR: YELLOW
EPITH CASTS URNS QL MICRO: 30 /UL (ref 0–25.1)
KETONES UR QL STRIP: NEGATIVE
LEUKOCYTE ESTERASE UR QL STRIP.AUTO: (no result)
NITRITE UR QL STRIP: NEGATIVE
PH UR: 6 [PH] (ref 5–8)
PROT UR QL STRIP: NEGATIVE
PROT UR QL STRIP: NEGATIVE
RBC # BLD AUTO: 1 /UL (ref 0–23.9)
SP GR UR: 1.01 (ref 1.01–1.03)
UROBILINOGEN UR STRIP-MCNC: 0.2 MG/DL (ref 0.2–1)
WBC # UR AUTO: 45 /UL (ref 0–25.8)

## 2020-09-09 NOTE — PDOC
Rapid Medical Evaluation


Chief Complaint: Urinary Problem


Time Seen by Provider: 09/09/20 13:07


Medical Evaluation: 


                                    Allergies











Allergy/AdvReac Type Severity Reaction Status Date / Time


 


ibuprofen Allergy Intermediate Swelling Verified 12/18/19 21:05


 


mushroom Allergy Intermediate Swelling Verified 12/18/19 21:05


 


peach [[place]] Allergy Intermediate Difficulty Verified 12/18/19 21:05





   Breathing  


 


peanut Allergy Intermediate Swelling Verified 12/18/19 21:05











09/09/20 13:08





I performed a brief in-person evaluation of this patient.


Pt is complaining of foul smelling urine and vaginal discharge for the last few 

days. She is also complaining of diarrhea for the last 3-4 days and the stool is

 orange. She denies abdominal pain.  She denies any fevers or chills. Pt states 

she also had a miscarriage two months ago. 





Pertinent physical exam findings: speaking in full sentences, no reproducible 

abdominal pain








I have ordered the following: cbc, cmp, ua, ucx, urine hcg, saline lock








Patient to proceed to ED for further evaluation.





**Discharge Disposition





- Diagnosis


 Diarrhea, Dysuria








- Referrals





- Patient Instructions





- Post Discharge Activity

## 2020-09-09 NOTE — PDOC
History of Present Illness





- General


Chief Complaint: Urinary Problem


Stated Complaint: BURNING URINATION/DIARRHEA


Time Seen by Provider: 20 13:07


History Source: Patient





- History of Present Illness


Timing/Duration: reports: other


Quality: reports: mild





Past History





- Medical History


Allergies/Adverse Reactions: 


                                    Allergies











Allergy/AdvReac Type Severity Reaction Status Date / Time


 


ibuprofen Allergy Intermediate Swelling Verified 19 21:05


 


mushroom Allergy Intermediate Swelling Verified 19 21:05


 


peach [[place]] Allergy Intermediate Difficulty Verified 19 21:05





   Breathing  


 


peanut Allergy Intermediate Swelling Verified 19 21:05











Home Medications: 


Ambulatory Orders





Ferrous Sulfate [Iron] 325 mg PO DAILY 19 


Melatonin 10 mg PO HS 19 


Mirtazapine [Remeron -] 15 mg PO HS 19 


hydrOXYzine PAMOATE [Vistaril -] 50 mg PO QID PRN 19 


Nitrofurantoin Monohyd/M-Cryst [Macrobid -] 100 mg PO BID #14 capsule 20 








Anemia: Yes


Asthma: No


Cancer: No


Cardiac Disorders: No


COPD: No


Diabetes: No


HTN: No


Liver Disease:  (HEP C)


Seizures: No


Thyroid Disease: No





- Reproductive History


Is Patient Pregnant Now?: No


 (#): 4


Para: 4


Therapeutic (s) & number: No


Spontaneous : 0





- Immunization History


Immunization Up to Date: Yes





- Psycho-Social/Smoking History


Smoking History: Current every day smoker


Have you smoked in the past 12 months: No


Number of Cigarettes Smoked Daily: 10


Information on smoking cessation initiated: No


'Breaking Loose' booklet given: 17





- Substance Abuse Hx (Audit-C & DAST Scrn)


How often the patient has a drink containing alcohol: 2-3 times / week


Number of drinks the patient has on a typical day: 1 or 2


Score: In Men: 4 or > Positive; In Women: 3 or > Positive: 3


Screen Result (Pos requires Nsg. Audit-10AR): Positive


In the last yr the pt used illegal drug/Rx for NonMed reason: Yes


Score:  Yes response is considered Positive: 1


Screen Result (Positive result requires Nsg. DAST-10): Positive





**Review of Systems





- Review of Systems


Constitutional: No: Chills, Fever


ABD/GI: Yes: Diarrhea, Nausea, Vomiting.  No: Blood Streaked Bowels, 

Constipated, Abdominal cramping


: Yes: Dysuria.  No: Discharge, Flank Pain, Hematuria





*Physical Exam





- Vital Signs


                                Last Vital Signs











Temp Pulse Resp BP Pulse Ox


 


 98.9 F   89   16   146/90   100 


 


 20 13:08  20 13:08  20 13:08  20 13:08  20 13:08














- Physical Exam


General Appearance: Yes: Appropriately Dressed.  No: Apparent Distress


HEENT: positive: Normal Voice


Neck: positive: Supple


Respiratory/Chest: negative: Respiratory Distress


Gastrointestinal/Abdominal: positive: Soft.  negative: Tender


Musculoskeletal: negative: CVA Tenderness


Integumentary: positive: Dry, Warm


Neurologic: positive: Fully Oriented, Alert, Normal Mood/Affect





Medical Decision Making





- Medical Decision Making





20 13:40


29 yo F, anemia, HLD, here w/ dysuria x 4 days, no hematuria, flank pain, 

n/v/f/c. Also reports several e/o NB watery diarrhea, abd pain w/ 2 e/o n/v x 3-

4 days, no f/c. No recent travel, sick contacts or antibiotic use





see exam





Dysuria


No e/o pyelo


UA











Diarrhea


No RF for serious dysentery


Dc w/ supportive tx


PMD f/u as needed








20 14:41


Pt declines to wait for the UA results, will tx and send ucx. 





Discharge





- Discharge Information


Problems reviewed: Yes


Clinical Impression/Diagnosis: 


 Dysuria





Diarrhea


Qualifiers:


 Diarrhea type: unspecified type Qualified Code(s): R19.7 - Diarrhea, unsp

ecified





Condition: Good


Disposition: HOME





- Additional Discharge Information


Prescriptions: 


Nitrofurantoin Monohyd/M-Cryst [Macrobid -] 100 mg PO BID #14 capsule





- Follow up/Referral


Referrals: 


Nicolás Quintana MD [Primary Care Provider] - 





- Patient Discharge Instructions


Patient Printed Discharge Instructions:  Urinary Tract Infection, Viral 

Gastroenteritis


Additional Instructions: 


Take medication as directed and return to ED for any worsening of symptoms





- Post Discharge Activity

## 2021-11-05 ENCOUNTER — HOSPITAL ENCOUNTER (EMERGENCY)
Dept: HOSPITAL 74 - JER | Age: 31
Discharge: HOME | End: 2021-11-05
Payer: COMMERCIAL

## 2021-11-05 VITALS — TEMPERATURE: 98.1 F | DIASTOLIC BLOOD PRESSURE: 87 MMHG | SYSTOLIC BLOOD PRESSURE: 125 MMHG | HEART RATE: 91 BPM

## 2021-11-05 VITALS — BODY MASS INDEX: 34.9 KG/M2

## 2021-11-05 DIAGNOSIS — M79.7: Primary | ICD-10-CM

## 2023-05-01 ENCOUNTER — HOSPITAL ENCOUNTER (OUTPATIENT)
Dept: HOSPITAL 74 - JER | Age: 33
Setting detail: OBSERVATION
LOS: 3 days | Discharge: HOME | End: 2023-05-04
Attending: FAMILY MEDICINE | Admitting: FAMILY MEDICINE
Payer: COMMERCIAL

## 2023-05-01 VITALS — BODY MASS INDEX: 34.8 KG/M2

## 2023-05-01 DIAGNOSIS — F17.210: ICD-10-CM

## 2023-05-01 DIAGNOSIS — D64.9: ICD-10-CM

## 2023-05-01 DIAGNOSIS — Z91.010: ICD-10-CM

## 2023-05-01 DIAGNOSIS — M79.7: ICD-10-CM

## 2023-05-01 DIAGNOSIS — R22.42: ICD-10-CM

## 2023-05-01 DIAGNOSIS — O03.9: Primary | ICD-10-CM

## 2023-05-01 DIAGNOSIS — Z88.6: ICD-10-CM

## 2023-05-01 DIAGNOSIS — Z86.19: ICD-10-CM

## 2023-05-01 DIAGNOSIS — Z91.018: ICD-10-CM

## 2023-05-01 PROCEDURE — P9058 RBC, L/R, CMV-NEG, IRRAD: HCPCS

## 2023-05-01 PROCEDURE — G0378 HOSPITAL OBSERVATION PER HR: HCPCS

## 2023-05-02 VITALS — RESPIRATION RATE: 20 BRPM

## 2023-05-02 LAB
ALBUMIN SERPL-MCNC: 3.1 G/DL (ref 3.4–5)
ALP SERPL-CCNC: 64 U/L (ref 45–117)
ALT SERPL-CCNC: 25 U/L (ref 13–61)
ANION GAP SERPL CALC-SCNC: 8 MMOL/L (ref 8–16)
APTT BLD: 25.2 SECONDS (ref 25.2–36.5)
AST SERPL-CCNC: 24 U/L (ref 15–37)
BASOPHILS # BLD: 0.1 % (ref 0–2)
BASOPHILS # BLD: 0.5 % (ref 0–2)
BILIRUB SERPL-MCNC: 0.2 MG/DL (ref 0.2–1)
BUN SERPL-MCNC: 6.6 MG/DL (ref 7–18)
CALCIUM SERPL-MCNC: 8.8 MG/DL (ref 8.5–10.1)
CHLORIDE SERPL-SCNC: 106 MMOL/L (ref 98–107)
CO2 SERPL-SCNC: 19 MMOL/L (ref 21–32)
CREAT SERPL-MCNC: 0.7 MG/DL (ref 0.55–1.3)
DEPRECATED RDW RBC AUTO: 18.4 % (ref 11.6–15.6)
DEPRECATED RDW RBC AUTO: 18.6 % (ref 11.6–15.6)
EOSINOPHIL # BLD: 0.1 % (ref 0–4.5)
EOSINOPHIL # BLD: 0.3 % (ref 0–4.5)
GLUCOSE SERPL-MCNC: 91 MG/DL (ref 74–106)
HCT VFR BLD CALC: 21.1 % (ref 32.4–45.2)
HCT VFR BLD CALC: 26.8 % (ref 32.4–45.2)
HGB BLD-MCNC: 6.6 GM/DL (ref 10.7–15.3)
HGB BLD-MCNC: 8.4 GM/DL (ref 10.7–15.3)
HIV 1+2 AB+HIV1 P24 AG SERPL QL IA: NEGATIVE
INR BLD: 1.13 (ref 0.83–1.09)
IRON SERPL-MCNC: 56 UG/DL (ref 50–175)
LYMPHOCYTES # BLD: 18 % (ref 8–40)
LYMPHOCYTES # BLD: 19.8 % (ref 8–40)
MCH RBC QN AUTO: 26.1 PG (ref 25.7–33.7)
MCH RBC QN AUTO: 26.3 PG (ref 25.7–33.7)
MCHC RBC AUTO-ENTMCNC: 31.2 G/DL (ref 32–36)
MCHC RBC AUTO-ENTMCNC: 31.3 G/DL (ref 32–36)
MCV RBC: 83.8 FL (ref 80–96)
MCV RBC: 84 FL (ref 80–96)
MONOCYTES # BLD AUTO: 4.6 % (ref 3.8–10.2)
MONOCYTES # BLD AUTO: 7.1 % (ref 3.8–10.2)
NEUTROPHILS # BLD: 72.7 % (ref 42.8–82.8)
NEUTROPHILS # BLD: 76.8 % (ref 42.8–82.8)
PLATELET # BLD AUTO: 194 10^3/UL (ref 134–434)
PLATELET # BLD AUTO: 256 10^3/UL (ref 134–434)
PMV BLD: 8.3 FL (ref 7.5–11.1)
PMV BLD: 8.8 FL (ref 7.5–11.1)
POTASSIUM SERPLBLD-SCNC: 3.8 MMOL/L (ref 3.5–5.1)
PROT SERPL-MCNC: 7.3 G/DL (ref 6.4–8.2)
PT PNL PPP: 13.1 SEC (ref 9.7–13)
RBC # BLD AUTO: 2.51 M/MM3 (ref 3.6–5.2)
RBC # BLD AUTO: 3.2 M/MM3 (ref 3.6–5.2)
SODIUM SERPL-SCNC: 133 MMOL/L (ref 136–145)
TIBC SERPL-MCNC: 574 UG/DL (ref 250–450)
TREPONEMA PALLIDUM AB [UNITS/VOLUME] IN SERUM OR PLASMA BY IMMUNOASSAY: (no result)
WBC # BLD AUTO: 11.2 K/MM3 (ref 4–10)
WBC # BLD AUTO: 15 K/MM3 (ref 4–10)

## 2023-05-02 PROCEDURE — 3E0337Z INTRODUCTION OF ELECTROLYTIC AND WATER BALANCE SUBSTANCE INTO PERIPHERAL VEIN, PERCUTANEOUS APPROACH: ICD-10-PCS | Performed by: FAMILY MEDICINE

## 2023-05-02 PROCEDURE — 3E033NZ INTRODUCTION OF ANALGESICS, HYPNOTICS, SEDATIVES INTO PERIPHERAL VEIN, PERCUTANEOUS APPROACH: ICD-10-PCS | Performed by: FAMILY MEDICINE

## 2023-05-03 LAB
ALBUMIN SERPL-MCNC: 2.6 G/DL (ref 3.4–5)
ALP SERPL-CCNC: 52 U/L (ref 45–117)
ALT SERPL-CCNC: 17 U/L (ref 13–61)
ANION GAP SERPL CALC-SCNC: 4 MMOL/L (ref 8–16)
AST SERPL-CCNC: 18 U/L (ref 15–37)
BILIRUB SERPL-MCNC: 0.2 MG/DL (ref 0.2–1)
BUN SERPL-MCNC: 6.8 MG/DL (ref 7–18)
CALCIUM SERPL-MCNC: 9 MG/DL (ref 8.5–10.1)
CHLORIDE SERPL-SCNC: 110 MMOL/L (ref 98–107)
CO2 SERPL-SCNC: 26 MMOL/L (ref 21–32)
CREAT SERPL-MCNC: 0.6 MG/DL (ref 0.55–1.3)
DEPRECATED RDW RBC AUTO: 16.8 % (ref 11.6–15.6)
GLUCOSE SERPL-MCNC: 86 MG/DL (ref 74–106)
HCT VFR BLD CALC: 28.3 % (ref 32.4–45.2)
HGB BLD-MCNC: 9.8 GM/DL (ref 10.7–15.3)
MCH RBC QN AUTO: 29.3 PG (ref 25.7–33.7)
MCHC RBC AUTO-ENTMCNC: 34.8 G/DL (ref 32–36)
MCV RBC: 84.2 FL (ref 80–96)
PLATELET # BLD AUTO: 198 10^3/UL (ref 134–434)
PMV BLD: 8.8 FL (ref 7.5–11.1)
POTASSIUM SERPLBLD-SCNC: 4.3 MMOL/L (ref 3.5–5.1)
PROT SERPL-MCNC: 5.8 G/DL (ref 6.4–8.2)
RBC # BLD AUTO: 3.36 M/MM3 (ref 3.6–5.2)
SODIUM SERPL-SCNC: 139 MMOL/L (ref 136–145)
WBC # BLD AUTO: 9.1 K/MM3 (ref 4–10)

## 2023-05-04 VITALS — TEMPERATURE: 98.1 F | HEART RATE: 67 BPM | DIASTOLIC BLOOD PRESSURE: 78 MMHG | SYSTOLIC BLOOD PRESSURE: 146 MMHG

## 2023-05-04 LAB
DEPRECATED RDW RBC AUTO: 16.8 % (ref 11.6–15.6)
HCT VFR BLD CALC: 27.6 % (ref 32.4–45.2)
HGB BLD-MCNC: 9.4 GM/DL (ref 10.7–15.3)
MCH RBC QN AUTO: 28.8 PG (ref 25.7–33.7)
MCHC RBC AUTO-ENTMCNC: 34 G/DL (ref 32–36)
MCV RBC: 84.7 FL (ref 80–96)
PLATELET # BLD AUTO: 201 10^3/UL (ref 134–434)
PMV BLD: 9 FL (ref 7.5–11.1)
RBC # BLD AUTO: 3.26 M/MM3 (ref 3.6–5.2)
WBC # BLD AUTO: 8.4 K/MM3 (ref 4–10)